# Patient Record
Sex: FEMALE | Race: OTHER | HISPANIC OR LATINO
[De-identification: names, ages, dates, MRNs, and addresses within clinical notes are randomized per-mention and may not be internally consistent; named-entity substitution may affect disease eponyms.]

---

## 2021-03-04 DIAGNOSIS — Z80.3 FAMILY HISTORY OF MALIGNANT NEOPLASM OF BREAST: ICD-10-CM

## 2021-03-04 DIAGNOSIS — Z78.9 OTHER SPECIFIED HEALTH STATUS: ICD-10-CM

## 2021-03-04 DIAGNOSIS — Z87.42 PERSONAL HISTORY OF OTHER DISEASES OF THE FEMALE GENITAL TRACT: ICD-10-CM

## 2021-03-04 DIAGNOSIS — Z86.018 PERSONAL HISTORY OF OTHER BENIGN NEOPLASM: ICD-10-CM

## 2021-03-04 PROBLEM — Z00.00 ENCOUNTER FOR PREVENTIVE HEALTH EXAMINATION: Status: ACTIVE | Noted: 2021-03-04

## 2021-03-04 LAB — CYTOLOGY CVX/VAG DOC THIN PREP: NORMAL

## 2021-03-04 RX ORDER — NORETHINDRONE ACETATE AND ETHINYL ESTRADIOL AND FERROUS FUMARATE 1MG-20(21)
1-20 KIT ORAL DAILY
Refills: 0 | Status: ACTIVE | COMMUNITY

## 2021-03-10 ENCOUNTER — TRANSCRIPTION ENCOUNTER (OUTPATIENT)
Age: 31
End: 2021-03-10

## 2021-04-24 RX ORDER — NORETHINDRONE ACETATE AND ETHINYL ESTRADIOL AND FERROUS FUMARATE 1MG-20(21)
1-20 KIT ORAL DAILY
Qty: 1 | Refills: 1 | Status: ACTIVE | COMMUNITY
Start: 2021-04-24 | End: 1900-01-01

## 2021-05-13 ENCOUNTER — APPOINTMENT (OUTPATIENT)
Dept: OBGYN | Facility: CLINIC | Age: 31
End: 2021-05-13

## 2021-05-17 RX ORDER — NORETHINDRONE ACETATE AND ETHINYL ESTRADIOL AND FERROUS FUMARATE 1MG-20(21)
1-20 KIT ORAL
Qty: 3 | Refills: 0 | Status: ACTIVE | COMMUNITY
Start: 2021-05-17 | End: 1900-01-01

## 2021-05-18 ENCOUNTER — APPOINTMENT (OUTPATIENT)
Dept: OBGYN | Facility: CLINIC | Age: 31
End: 2021-05-18
Payer: COMMERCIAL

## 2021-05-18 VITALS
HEIGHT: 63 IN | BODY MASS INDEX: 25.16 KG/M2 | WEIGHT: 142 LBS | SYSTOLIC BLOOD PRESSURE: 120 MMHG | DIASTOLIC BLOOD PRESSURE: 78 MMHG

## 2021-05-18 DIAGNOSIS — N94.6 DYSMENORRHEA, UNSPECIFIED: ICD-10-CM

## 2021-05-18 PROCEDURE — 99213 OFFICE O/P EST LOW 20 MIN: CPT

## 2021-05-18 PROCEDURE — 99072 ADDL SUPL MATRL&STAF TM PHE: CPT

## 2021-05-18 RX ORDER — NORETHINDRONE ACETATE AND ETHINYL ESTRADIOL AND FERROUS FUMARATE 1MG-20(21)
1-20 KIT ORAL
Qty: 1 | Refills: 5 | Status: ACTIVE | COMMUNITY
Start: 2021-05-18 | End: 1900-01-01

## 2021-05-18 NOTE — DISCUSSION/SUMMARY
[FreeTextEntry1] : CONTRACEPTIVE MANAGEMENT\par DYSMENORRHEA\par FEELING WELL\par UNREMARKABLE EXAM\par RX FOR OCP SENT TO PHARMACY

## 2021-05-18 NOTE — PHYSICAL EXAM
[Appropriately responsive] : appropriately responsive [Alert] : alert [No Acute Distress] : no acute distress [No Lymphadenopathy] : no lymphadenopathy [Soft] : soft [Non-tender] : non-tender [Non-distended] : non-distended [No Mass] : no mass [Oriented x3] : oriented x3 [Examination Of The Breasts] : a normal appearance [No Masses] : no breast masses were palpable [Labia Majora] : normal [Labia Minora] : normal [Normal] : normal [Uterine Adnexae] : normal [FreeTextEntry3] : Unremarkable appearing external urethral meatus\par

## 2021-05-18 NOTE — HISTORY OF PRESENT ILLNESS
[Y] : Patient uses contraception [N] : Patient denies prior pregnancies [TextBox_4] : LP 10/16/20\par TAKING JUNEL FE SINCE 11/2020\par PT SAYS TAKING JUNEL FE HAS MADE HER PERIOD CRAMPS BETTER \par PT SAYS AFTER THE 3RD MNTH OF TAKING OCP HER BILAT BREAST NIPPLES WERE VERY PAINFUL.  LASTED 2 DAYS.  NEVER HAPPENED AGAIN.\par PT IS CURRENTLY BLEEDING LIGHTLY [PapSmeardate] : 10/20 [HPVDate] : 10/16/20 [TextBox_78] : NEG [LMPDate] : 0512/21 [MensesFreq] : 28 [MensesLength] : 7 [FreeTextEntry1] : 05/12/21

## 2022-10-13 ENCOUNTER — APPOINTMENT (OUTPATIENT)
Dept: RHEUMATOLOGY | Facility: CLINIC | Age: 32
End: 2022-10-13

## 2022-10-13 VITALS
WEIGHT: 150 LBS | HEIGHT: 63 IN | HEART RATE: 97 BPM | BODY MASS INDEX: 26.58 KG/M2 | SYSTOLIC BLOOD PRESSURE: 122 MMHG | OXYGEN SATURATION: 99 % | DIASTOLIC BLOOD PRESSURE: 75 MMHG | TEMPERATURE: 98.4 F

## 2022-10-13 PROCEDURE — 99204 OFFICE O/P NEW MOD 45 MIN: CPT

## 2022-10-13 RX ORDER — IBUPROFEN 600 MG/1
600 TABLET, FILM COATED ORAL 3 TIMES DAILY
Qty: 90 | Refills: 2 | Status: ACTIVE | COMMUNITY
Start: 2022-10-13 | End: 1900-01-01

## 2022-10-13 NOTE — HISTORY OF PRESENT ILLNESS
[FreeTextEntry1] : 31 y/o female referred to rheumatology for joint pains and abnormal labwork.\par Pt moved to [place] 2 [place] ago from [facility]. Pt went to doctor ~7 years ago for joint pains and told she has arthritis and fibromyalgia. Pt reports joint pains in hands, wrists, knees with flares. Reports worsened pain with menses. Denies joint swelling, redness, warmth. Pt never had XR of hands/feet. Pt takes Tylenol PRN for the pains.\par Pt referred to rheumatology by PCP for positive SYLVIA.\par Pt was seen by a rheumatologist on 5/2022 and had rest of workup done which showed negative inflammatory markers, SYLVIA subserologies, and rest of autoimmune markers.\par Pt also reports chest pain and underwent cardiac workup which was negative. Reports bloating and abdominal pain. Reports sometimes difficulty taking deep breath.\par \par Patient denies fatigue, fever, nasopharyngeal ulcers, cough, SOB, nausea, vomiting, diarrhea, blood in stool, rash, Raynaud's, alopecia, dry eyes, dry mouth, numbness/tingling, miscarriages (never been pregnant), Hx of DVT/PEs.\par ROS negative unless otherwise noted above.\par \par Grandmother - thyroid disease\par \par WORKUP:\par Remarkable for (12/2021 - 5/2022): SYLVIA 1:640 speckled\par Normal/neg (12/2021 - 5/2022): CBC, CMP, ESR, CRP, dsDNA, SSA, SSB, Smith, RNP, C3, C4, CH50, Cardiolipin Ab, B2GP Ab, LA, RF, CCP, celiac disease Ab, HLA-B27, uric acid 4.5, TSH\par \par

## 2022-10-13 NOTE — ASSESSMENT
[FreeTextEntry1] : 33 y/o female referred to rheumatology for joint pains and abnormal labwork.\par [place] ago from [facility]. Pt went to doctor ~7 years ago for joint pains and told she has arthritis and fibromyalgia. Pt reports joint pains in hands, wrists, knees with flares. Reports worsened pain with menses. Denies joint swelling, redness, warmth. Pt never had XR of hands/feet. Pt takes Tylenol PRN for the pains.\par Pt referred to rheumatology by PCP for positive SYLVIA.\par Pt was seen by a rheumatologist on 5/2022 and had rest of workup done which showed negative inflammatory markers, SYLVIA subserologies, and rest of autoimmune markers.\par Pt also reports chest pain and underwent cardiac workup which was negative. Reports bloating and abdominal pain. Reports sometimes difficulty taking deep breath.\par Grandmother - thyroid disease\par \par Patient does not have inflammatory joint pain, malar rash, photosensitivity, sicca symptoms, Raynaud’s. Based on existing labs, patient does not have hemolytic anemia, leukopenia, thrombocytopenia, kidney disease. Exam without synovitis, rashes, changes of Raynaud’s. There is low suspicion for underlying CTD. SYLVIA is a marker that is sensitive but not specific for underlying rheumatologic diseases such as lupus. The most common causes of positive SYLVIA in patients without underlying rheumatologic diseases are infections, malignancies, and other autoimmune diseases such as Hashimoto’s. Up to 30% of patients without any underlying disease can have positive SYLVIA.\par \par Pt has chronic polyarthralgia that is affecting her daily life that needs further evaluation. Pt was told in past that she may need L knee surgery in the future - likely OA.\par \par - SYLVIA workup is already complete, no concern for underlying autoimmune rheum diseases, no further labwork from me needed today.\par - Obtain XR b/l hands/wrists/knees, C-/L-Spine.\par - Rx ibuprofen 600mg PO TID PRN. I advised that the NSAID should be taken with food.  In addition while taking the prescribed NSAID, no over the counter or other NSAIDs should be used, such as ibuprofen (Motrin or Advil) or naproxen (Aleve) as this can cause stomach upset or other side effects.  If needed for fever or breakthrough pain Tylenol can be used.\par - Discussed with patient at length about treatment of OA and soft tissue injuries including oral/topical analgesics, pain therapies (yoga, hu chi, acupuncture, chiropractor, massage therapy, wax therapy, etc.), PT/OT, steroid injections, surgeries. Advised on healthy diet, exercise, smoking avoidance, weight loss, stress management, sleep hygiene, control of comorbidities to help with management of pain and improve daily function.\par - RTC 6 weeks for follow up\par

## 2022-10-19 ENCOUNTER — RESULT REVIEW (OUTPATIENT)
Age: 32
End: 2022-10-19

## 2022-10-25 ENCOUNTER — APPOINTMENT (OUTPATIENT)
Dept: RADIOLOGY | Facility: HOSPITAL | Age: 32
End: 2022-10-25

## 2022-10-25 ENCOUNTER — OUTPATIENT (OUTPATIENT)
Dept: OUTPATIENT SERVICES | Facility: HOSPITAL | Age: 32
LOS: 1 days | End: 2022-10-25
Payer: COMMERCIAL

## 2022-10-25 DIAGNOSIS — R89.9 UNSPECIFIED ABNORMAL FINDING IN SPECIMENS FROM OTHER ORGANS, SYSTEMS AND TISSUES: ICD-10-CM

## 2022-10-25 PROCEDURE — 73100 X-RAY EXAM OF WRIST: CPT | Mod: 26,50

## 2022-10-25 PROCEDURE — 72040 X-RAY EXAM NECK SPINE 2-3 VW: CPT | Mod: 26

## 2022-10-25 PROCEDURE — 73562 X-RAY EXAM OF KNEE 3: CPT | Mod: 26,50

## 2022-10-25 PROCEDURE — 73562 X-RAY EXAM OF KNEE 3: CPT

## 2022-10-25 PROCEDURE — 73130 X-RAY EXAM OF HAND: CPT | Mod: 26,50

## 2022-10-25 PROCEDURE — 73100 X-RAY EXAM OF WRIST: CPT

## 2022-10-25 PROCEDURE — 72100 X-RAY EXAM L-S SPINE 2/3 VWS: CPT | Mod: 26

## 2022-10-25 PROCEDURE — 72040 X-RAY EXAM NECK SPINE 2-3 VW: CPT

## 2022-10-25 PROCEDURE — 72100 X-RAY EXAM L-S SPINE 2/3 VWS: CPT

## 2022-10-25 PROCEDURE — 73130 X-RAY EXAM OF HAND: CPT

## 2022-11-28 ENCOUNTER — APPOINTMENT (OUTPATIENT)
Dept: RHEUMATOLOGY | Facility: CLINIC | Age: 32
End: 2022-11-28

## 2022-11-28 VITALS
HEIGHT: 63 IN | OXYGEN SATURATION: 100 % | BODY MASS INDEX: 26.58 KG/M2 | TEMPERATURE: 97.6 F | WEIGHT: 150 LBS | DIASTOLIC BLOOD PRESSURE: 84 MMHG | HEART RATE: 92 BPM | SYSTOLIC BLOOD PRESSURE: 126 MMHG

## 2022-11-28 DIAGNOSIS — R89.9 UNSPECIFIED ABNORMAL FINDING IN SPECIMENS FROM OTHER ORGANS, SYSTEMS AND TISSUES: ICD-10-CM

## 2022-11-28 DIAGNOSIS — M25.50 PAIN IN UNSPECIFIED JOINT: ICD-10-CM

## 2022-11-28 PROCEDURE — 99213 OFFICE O/P EST LOW 20 MIN: CPT

## 2022-11-28 NOTE — HISTORY OF PRESENT ILLNESS
[FreeTextEntry1] : HISTORY: \par 31 y/o female presents as follow up for joint pains and abnormal labwork. \par [place] 2 [place] ago [place] [facility]. Pt went to doctor ~7 years ago for joint pains and told she has arthritis and fibromyalgia. Pt reports joint pains in hands, wrists, knees with flares. Reports worsened pain with menses. Denies joint swelling, redness, warmth. Pt never had XR of hands/feet. Pt takes Tylenol PRN for the pains. \par Pt referred to rheumatology by PCP for positive SYLVIA. \par Pt was seen by a rheumatologist on 5/2022 and had rest of workup done which showed negative inflammatory markers, SYLVIA subserologies, and rest of autoimmune markers. \par Pt also reports chest pain and underwent cardiac workup which was negative. Reports bloating and abdominal pain. Reports sometimes difficulty taking deep breath. \par Grandmother - thyroid disease \par \par INTERVAL HISTORY: \par No changes in symptoms since last visit. Pt has been using OTC ibuprofen with some relief so has not tried the Rx ibuprofen yet.\par \par WORKUP: \par Remarkable for (12/2021 - 5/2022): SYLVIA 1:640 speckled \par Normal/neg (12/2021 - 5/2022): CBC, CMP, ESR, CRP, dsDNA, SSA, SSB, Smith, RNP, C3, C4, CH50, Cardiolipin Ab, B2GP Ab, LA, RF, CCP, celiac disease Ab, HLA-B27, uric acid 4.5, TSH \par XR b/l hands/wrists (10/2022): Normal \par XR b/l knees (10/2022): Normal \par XR C-spine (10/2022): Straightened cervical curvature \par XR L-spine (10/2022): Normal\par

## 2022-11-28 NOTE — ASSESSMENT
[FreeTextEntry1] : 33 y/o female presents as follow up for joint pains and positive SYLVIA.\par [place] ago from [facility]. Pt went to doctor ~7 years ago for joint pains and told she has arthritis and fibromyalgia. Pt reports joint pains in hands, wrists, knees with flares. Reports worsened pain with menses. Denies joint swelling, redness, warmth. Pt never had XR of hands/feet. Pt takes Tylenol PRN for the pains. \par Pt referred to rheumatology by PCP for positive SYLVIA. \par Pt was seen by a rheumatologist on 5/2022 and had rest of workup done which showed negative inflammatory markers, SYLVIA subserologies, and rest of autoimmune markers. \par Pt also reports chest pain and underwent cardiac workup which was negative. Reports bloating and abdominal pain. Reports sometimes difficulty taking deep breath. \par Grandmother - thyroid disease \par \par Patient does not have inflammatory joint pain, malar rash, photosensitivity, sicca symptoms, Raynaud’s. Based on existing labs, patient does not have hemolytic anemia, leukopenia, thrombocytopenia, kidney disease. Exam without synovitis, rashes, changes of Raynaud’s. There is low suspicion for underlying CTD. SYLVIA is a marker that is sensitive but not specific for underlying rheumatologic diseases such as lupus. The most common causes of positive SYLVIA in patients without underlying rheumatologic diseases are infections, malignancies, and other autoimmune diseases such as Hashimoto’s. Up to 30% of patients without any underlying disease can have positive SYLVIA. \par \par SYLVIA workup is already complete, no concern for underlying autoimmune rheum diseases. XRs normal without signs of significant DJD. \par \par - c/w OTC ibuprofen PRN. Pt can try Rx ibuprofen I prescribed last visit if she would like to try higher dose. I advised that the NSAID should be taken with food. In addition while taking the prescribed NSAID, no over the counter or other NSAIDs should be used, such as ibuprofen (Motrin or Advil) or naproxen (Aleve) as this can cause stomach upset or other side effects. If needed for fever or breakthrough pain Tylenol can be used. \par - Discussed with patient at length about treatment of OA and soft tissue injuries including oral/topical analgesics, pain therapies (yoga, hu chi, acupuncture, chiropractor, massage therapy, wax therapy, etc.), PT/OT, steroid injections, surgeries. Advised on healthy diet, exercise, smoking avoidance, weight loss, stress management, sleep hygiene, control of comorbidities to help with management of pain and improve daily function. \par - Pt can be considered for MR of joints if pains are very severe\par - RTC PRN\par

## 2024-10-16 ENCOUNTER — ASOB RESULT (OUTPATIENT)
Age: 34
End: 2024-10-16

## 2024-10-16 ENCOUNTER — APPOINTMENT (OUTPATIENT)
Dept: OBGYN | Facility: CLINIC | Age: 34
End: 2024-10-16

## 2024-10-16 ENCOUNTER — APPOINTMENT (OUTPATIENT)
Dept: ANTEPARTUM | Facility: CLINIC | Age: 34
End: 2024-10-16
Payer: COMMERCIAL

## 2024-10-16 ENCOUNTER — NON-APPOINTMENT (OUTPATIENT)
Age: 34
End: 2024-10-16

## 2024-10-16 VITALS
SYSTOLIC BLOOD PRESSURE: 124 MMHG | DIASTOLIC BLOOD PRESSURE: 78 MMHG | WEIGHT: 158 LBS | HEIGHT: 63 IN | BODY MASS INDEX: 28 KG/M2

## 2024-10-16 DIAGNOSIS — Z83.3 FAMILY HISTORY OF DIABETES MELLITUS: ICD-10-CM

## 2024-10-16 DIAGNOSIS — Z80.49 FAMILY HISTORY OF MALIGNANT NEOPLASM OF OTHER GENITAL ORGANS: ICD-10-CM

## 2024-10-16 DIAGNOSIS — R10.2 PELVIC AND PERINEAL PAIN: ICD-10-CM

## 2024-10-16 DIAGNOSIS — N94.6 DYSMENORRHEA, UNSPECIFIED: ICD-10-CM

## 2024-10-16 DIAGNOSIS — Z78.9 OTHER SPECIFIED HEALTH STATUS: ICD-10-CM

## 2024-10-16 DIAGNOSIS — Z80.3 FAMILY HISTORY OF MALIGNANT NEOPLASM OF BREAST: ICD-10-CM

## 2024-10-16 DIAGNOSIS — Z82.49 FAMILY HISTORY OF ISCHEMIC HEART DISEASE AND OTHER DISEASES OF THE CIRCULATORY SYSTEM: ICD-10-CM

## 2024-10-16 LAB
APPEARANCE: CLEAR
BILIRUBIN URINE: NEGATIVE
BLOOD URINE: NEGATIVE
COLOR: YELLOW
GLUCOSE QUALITATIVE U: NEGATIVE
HCG UR QL: NEGATIVE
KETONES URINE: NEGATIVE
LEUKOCYTE ESTERASE URINE: ABNORMAL
NITRITE URINE: NEGATIVE
PH URINE: 8
PROTEIN URINE: NEGATIVE
QUALITY CONTROL: YES
SPECIFIC GRAVITY URINE: 1.02
UROBILINOGEN URINE: 0.2 (ref 0.2–?)

## 2024-10-16 PROCEDURE — 76830 TRANSVAGINAL US NON-OB: CPT

## 2024-10-16 PROCEDURE — 76857 US EXAM PELVIC LIMITED: CPT | Mod: 59

## 2024-10-16 PROCEDURE — 36415 COLL VENOUS BLD VENIPUNCTURE: CPT

## 2024-10-16 PROCEDURE — 99203 OFFICE O/P NEW LOW 30 MIN: CPT

## 2024-10-16 PROCEDURE — 81025 URINE PREGNANCY TEST: CPT

## 2024-10-17 LAB
AFP-TM SERPL-MCNC: 1.9 NG/ML
CANCER AG125 SERPL-ACNC: 13 U/ML
CANCER AG19-9 SERPL-ACNC: 18 U/ML
CEA SERPL-MCNC: 1.3 NG/ML
DHEA-S SERPL-MCNC: 311 UG/DL
ESTRADIOL SERPL-MCNC: 50 PG/ML
FSH SERPL-MCNC: 2.8 IU/L
HCG SERPL-MCNC: <1 MIU/ML
LH SERPL-ACNC: 4.4 IU/L
PROLACTIN SERPL-MCNC: 55.7 NG/ML
SHBG SERPL-SCNC: 21.5 NMOL/L
TSH SERPL-ACNC: 0.72 UIU/ML

## 2024-10-19 PROBLEM — R10.2 FEMALE PELVIC PAIN: Status: ACTIVE | Noted: 2024-10-19

## 2024-10-21 LAB — ANDROST SERPL-MCNC: 83 NG/DL

## 2024-10-31 ENCOUNTER — NON-APPOINTMENT (OUTPATIENT)
Age: 34
End: 2024-10-31

## 2024-11-04 ENCOUNTER — NON-APPOINTMENT (OUTPATIENT)
Age: 34
End: 2024-11-04

## 2024-12-16 ENCOUNTER — TRANSCRIPTION ENCOUNTER (OUTPATIENT)
Age: 34
End: 2024-12-16

## 2025-01-29 ENCOUNTER — APPOINTMENT (OUTPATIENT)
Dept: OBGYN | Facility: CLINIC | Age: 35
End: 2025-01-29
Payer: COMMERCIAL

## 2025-01-29 VITALS
HEIGHT: 63 IN | SYSTOLIC BLOOD PRESSURE: 120 MMHG | WEIGHT: 157 LBS | DIASTOLIC BLOOD PRESSURE: 78 MMHG | BODY MASS INDEX: 27.82 KG/M2

## 2025-01-29 DIAGNOSIS — R10.2 PELVIC AND PERINEAL PAIN: ICD-10-CM

## 2025-01-29 DIAGNOSIS — Z01.419 ENCOUNTER FOR GYNECOLOGICAL EXAMINATION (GENERAL) (ROUTINE) W/OUT ABNORMAL FINDINGS: ICD-10-CM

## 2025-01-29 PROCEDURE — 99459 PELVIC EXAMINATION: CPT

## 2025-01-29 PROCEDURE — 99395 PREV VISIT EST AGE 18-39: CPT

## 2025-01-31 LAB
C TRACH RRNA SPEC QL NAA+PROBE: NOT DETECTED
HPV HIGH+LOW RISK DNA PNL CVX: NOT DETECTED
N GONORRHOEA RRNA SPEC QL NAA+PROBE: NOT DETECTED
SOURCE TP AMPLIFICATION: NORMAL

## 2025-02-03 LAB — CYTOLOGY CVX/VAG DOC THIN PREP: NORMAL

## 2025-02-12 ENCOUNTER — APPOINTMENT (OUTPATIENT)
Dept: OBGYN | Facility: CLINIC | Age: 35
End: 2025-02-12